# Patient Record
Sex: FEMALE | Race: BLACK OR AFRICAN AMERICAN
[De-identification: names, ages, dates, MRNs, and addresses within clinical notes are randomized per-mention and may not be internally consistent; named-entity substitution may affect disease eponyms.]

---

## 2018-04-09 ENCOUNTER — HOSPITAL ENCOUNTER (OUTPATIENT)
Dept: HOSPITAL 8 - CFH | Age: 53
Discharge: HOME | End: 2018-04-09
Attending: NURSE PRACTITIONER
Payer: COMMERCIAL

## 2018-04-09 DIAGNOSIS — R92.2: Primary | ICD-10-CM

## 2020-03-03 ENCOUNTER — OFFICE VISIT (OUTPATIENT)
Dept: URGENT CARE | Facility: PHYSICIAN GROUP | Age: 55
End: 2020-03-03
Payer: COMMERCIAL

## 2020-03-03 VITALS
DIASTOLIC BLOOD PRESSURE: 90 MMHG | SYSTOLIC BLOOD PRESSURE: 146 MMHG | OXYGEN SATURATION: 94 % | RESPIRATION RATE: 18 BRPM | HEART RATE: 102 BPM | HEIGHT: 58 IN | TEMPERATURE: 99 F

## 2020-03-03 DIAGNOSIS — R50.9 FEVER, UNSPECIFIED FEVER CAUSE: ICD-10-CM

## 2020-03-03 DIAGNOSIS — J11.1 INFLUENZA-LIKE ILLNESS: Primary | ICD-10-CM

## 2020-03-03 LAB
FLUAV+FLUBV AG SPEC QL IA: NORMAL
INT CON NEG: NEGATIVE
INT CON POS: POSITIVE

## 2020-03-03 PROCEDURE — 87804 INFLUENZA ASSAY W/OPTIC: CPT | Performed by: PHYSICIAN ASSISTANT

## 2020-03-03 PROCEDURE — 99203 OFFICE O/P NEW LOW 30 MIN: CPT | Performed by: PHYSICIAN ASSISTANT

## 2020-03-03 RX ORDER — EMPAGLIFLOZIN 25 MG/1
TABLET, FILM COATED ORAL
COMMUNITY

## 2020-03-03 RX ORDER — CODEINE PHOSPHATE/GUAIFENESIN 10-100MG/5
10 LIQUID (ML) ORAL 4 TIMES DAILY PRN
Qty: 200 ML | Refills: 0 | Status: SHIPPED | OUTPATIENT
Start: 2020-03-03 | End: 2020-03-08

## 2020-03-03 ASSESSMENT — ENCOUNTER SYMPTOMS: FEVER: 1

## 2020-03-03 NOTE — LETTER
March 3, 2020         Patient: Miroslava Harley   YOB: 1965   Date of Visit: 3/3/2020           To Whom it May Concern:    Miroslava Harley was seen in my clinic on 3/3/2020. She has been diagnosed with Influenza.     She may return to work on 3/9/2020.    If you have any questions or concerns, please don't hesitate to call.        Sincerely,           Rosamaria Fisher P.A.-C.  Electronically Signed

## 2020-03-03 NOTE — LETTER
March 3, 2020         Patient: Miroslava Harley   YOB: 1965   Date of Visit: 3/3/2020           To Whom it May Concern:    Miroslava Harley was seen in my clinic on 3/3/2020. She has been diagnosed with Influenza. She may return to work on 3/6/2020.    If you have any questions or concerns, please don't hesitate to call.        Sincerely,           Rosamaria Fisher P.A.-C.  Electronically Signed

## 2020-03-04 NOTE — PATIENT INSTRUCTIONS

## 2020-03-04 NOTE — PROGRESS NOTES
"Subjective:      Miroslava Harley is a 54 y.o. female who presents with Fever (fever x4 days, cough x3 days)    PMH:  has no past medical history on file.  MEDS:   Current Outpatient Medications:   •  Empagliflozin (JARDIANCE) 25 MG Tab, Take  by mouth., Disp: , Rfl:   •  SITagliptin (JANUVIA) 100 MG Tab, Take 100 mg by mouth every day., Disp: , Rfl:   ALLERGIES:   Allergies   Allergen Reactions   • Erythromycin      SURGHX: History reviewed. No pertinent surgical history.  SOCHX:    FH: Reviewed with patient, not pertinent to this visit.           Patient presents with:  Fever: fever x4 days, cough x3 days        Influenza   This is a new problem. Episode onset: 3 days. The problem occurs constantly. The problem has been rapidly worsening. Associated symptoms include chills, congestion, coughing, a fever, headaches, myalgias and a sore throat. Pertinent negatives include no chest pain, nausea or vomiting. The symptoms are aggravated by coughing and exertion. She has tried drinking, acetaminophen, lying down, NSAIDs, position changes and rest for the symptoms. The treatment provided mild relief.       Review of Systems   Constitutional: Positive for chills, fever and malaise/fatigue.   HENT: Positive for congestion, ear pain and sore throat.    Eyes: Negative for discharge.   Respiratory: Positive for cough and sputum production. Negative for shortness of breath.    Cardiovascular: Negative for chest pain.   Gastrointestinal: Negative for diarrhea, nausea and vomiting.   Musculoskeletal: Positive for myalgias.   Neurological: Positive for headaches.   All other systems reviewed and are negative.         Objective:     /90 (BP Location: Left arm, Patient Position: Sitting, BP Cuff Size: Small adult)   Pulse (!) 102   Temp 37.2 °C (99 °F) (Temporal)   Resp 18   Ht 1.473 m (4' 10\")   SpO2 94%      Physical Exam  Vitals signs and nursing note reviewed.   Constitutional:       General: She is not in acute " distress.     Appearance: Normal appearance. She is well-developed. She is ill-appearing. She is not toxic-appearing or diaphoretic.   HENT:      Head: Normocephalic and atraumatic.      Right Ear: Tympanic membrane and external ear normal.      Left Ear: Tympanic membrane and external ear normal.      Nose: Mucosal edema, congestion and rhinorrhea present.      Mouth/Throat:      Mouth: Mucous membranes are moist.      Pharynx: Uvula midline. Posterior oropharyngeal erythema present. No oropharyngeal exudate.      Tonsils: No tonsillar exudate.   Eyes:      General: Lids are normal.      Extraocular Movements: Extraocular movements intact.      Conjunctiva/sclera:      Right eye: Right conjunctiva is injected.      Left eye: Left conjunctiva is injected.      Pupils: Pupils are equal, round, and reactive to light.   Neck:      Musculoskeletal: Normal range of motion and neck supple.   Cardiovascular:      Rate and Rhythm: Normal rate and regular rhythm.      Pulses: Normal pulses.      Heart sounds: Normal heart sounds.   Pulmonary:      Effort: Pulmonary effort is normal. No respiratory distress.      Breath sounds: No wheezing or rales.   Abdominal:      Palpations: Abdomen is soft.   Musculoskeletal: Normal range of motion.   Lymphadenopathy:      Cervical: No cervical adenopathy.   Skin:     General: Skin is warm and dry.      Capillary Refill: Capillary refill takes less than 2 seconds.   Neurological:      General: No focal deficit present.      Mental Status: She is alert and oriented to person, place, and time.      Gait: Gait normal.   Psychiatric:         Mood and Affect: Mood normal.         Behavior: Behavior is cooperative.                 Assessment/Plan:     1. Influenza-like illness  guaifenesin-codeine (TUSSI-ORGANIDIN NR) 100-10 MG/5ML syrup    POCT Influenza A/B   2. Fever, unspecified fever cause  guaifenesin-codeine (TUSSI-ORGANIDIN NR) 100-10 MG/5ML syrup     PT likely has the flu, however, pt  presentation this clinic is out of the window for antiviral treatment or patient declined treatment.  This was discussed with patient/parent who verbalized understanding of discussion.    PT can continue OTC medications, increase fluids and rest until symptoms improve.     PT instructed not to drive or operate heavy machinery or drink alcohol while taking this medication because it contains either a narcotic or benzodiazepines which causes drowsiness. PT verbalized understanding of these instructions.     [facility] Aware web site evaluation: I have obtained and reviewed patient utilization report from [facility] database prior to writing prescription for controlled substance.  No history of abuse.    PT should follow up with PCP in 1-2 days for re-evaluation if symptoms have not improved.  Discussed red flags and reasons to return to UC or ED.  Pt and/or family verbalized understanding of diagnosis and follow up instructions and was offered informational handout on diagnosis.  PT discharged.

## 2020-03-06 ASSESSMENT — ENCOUNTER SYMPTOMS
DIARRHEA: 0
SHORTNESS OF BREATH: 0
CHILLS: 1
COUGH: 1
EYE DISCHARGE: 0
SPUTUM PRODUCTION: 1
HEADACHES: 1
NAUSEA: 0
SORE THROAT: 1
MYALGIAS: 1
VOMITING: 0

## 2023-03-22 ENCOUNTER — OFFICE VISIT (OUTPATIENT)
Dept: URGENT CARE | Facility: CLINIC | Age: 58
End: 2023-03-22
Payer: COMMERCIAL

## 2023-03-22 VITALS
HEART RATE: 103 BPM | TEMPERATURE: 98.1 F | WEIGHT: 170 LBS | DIASTOLIC BLOOD PRESSURE: 84 MMHG | HEIGHT: 58 IN | BODY MASS INDEX: 35.68 KG/M2 | RESPIRATION RATE: 16 BRPM | SYSTOLIC BLOOD PRESSURE: 138 MMHG | OXYGEN SATURATION: 97 %

## 2023-03-22 DIAGNOSIS — R04.0 EPISTAXIS: ICD-10-CM

## 2023-03-22 PROCEDURE — 99203 OFFICE O/P NEW LOW 30 MIN: CPT | Performed by: PHYSICIAN ASSISTANT

## 2023-03-22 NOTE — PROGRESS NOTES
"Subjective:   Miroslava Harley is a 57 y.o. female who presents for Nose Bleed (Bloody nose x past 30 mins)  This is a pleasant 57-year-old female who presents with acute onset of epistaxis at approximately 8:30 AM this morning.  She notes no antecedent trauma.  She is not using blood thinners.  She does not take excessive amounts of NSAIDs.  She has had a prior epistaxis episode several years ago but no others.  No foreign body concerns.  No significant seasonal allergies or sinusitis.  She does not feel lightheaded.  She has been compressing the nose but has not tried any other treatments.  No known history of thrombocytopenia.      Medications:  Jardiance Tabs  SITagliptin Tabs    Allergies:             Erythromycin    Surgical History:       No past surgical history on file.    Past Social Hx:  Miroslava Harley  reports that she has never smoked. She has never used smokeless tobacco.     Past Family Hx:   Miroslava Harley family history is not on file.       Problem list, medications, and allergies reviewed by myself today in Epic.     Objective:     /84 (BP Location: Left arm, Patient Position: Sitting, BP Cuff Size: Large adult)   Pulse (!) 103   Temp 36.7 °C (98.1 °F) (Temporal)   Resp 16   Ht 1.473 m (4' 10\")   Wt 77.1 kg (170 lb)   SpO2 97%   BMI 35.53 kg/m²     Physical Exam  Vitals and nursing note reviewed.   Constitutional:       General: She is not in acute distress.     Appearance: Normal appearance. She is not ill-appearing, toxic-appearing or diaphoretic.   HENT:      Head: Normocephalic.      Right Ear: External ear normal.      Left Ear: External ear normal.      Nose: No signs of injury or laceration.      Right Nostril: Epistaxis present.      Left Nostril: Epistaxis present.      Right Turbinates: Enlarged.      Left Turbinates: Enlarged.      Right Sinus: No maxillary sinus tenderness or frontal sinus tenderness.      Left Sinus: No maxillary sinus tenderness or frontal sinus " tenderness.      Comments: Epistaxis noted bilaterally upon arrival.  Patient encouraged to nose blow hard.  2 sprays of Jeff-Synephrine administered to both nares.  Nasal clamp placed for 20 minutes.  After clamping no residual epistaxis noted.  Patient was monitored in the office for another 20 to 30 minutes.  No residual bleeding noted.  Examination of septum demonstrates no obvious bleeders at this time.  No obvious oropharyngeal blood.  She does not taste blood.  Turbinates are enlarged and somewhat swollen.     Mouth/Throat:      Mouth: Mucous membranes are moist.   Eyes:      Extraocular Movements: Extraocular movements intact.      Conjunctiva/sclera: Conjunctivae normal.      Pupils: Pupils are equal, round, and reactive to light.   Cardiovascular:      Rate and Rhythm: Normal rate.      Pulses: Normal pulses.   Pulmonary:      Effort: Pulmonary effort is normal. No respiratory distress.   Musculoskeletal:      Cervical back: Normal range of motion.   Skin:     General: Skin is warm.      Findings: No lesion or rash.   Neurological:      General: No focal deficit present.      Mental Status: She is alert and oriented to person, place, and time.   Psychiatric:         Thought Content: Thought content normal.         Judgment: Judgment normal.         Assessment/Plan:     Diagnosis and Associated Orders:     1. Epistaxis        Comments/MDM:  Fortunately epistaxis was controlled with Jeff-Synephrine and clamping with no residual bleeding.  Patient monitored for an additional 30 minutes without signs of additional bleeding.  She is given the Jeff-Synephrine with instructions and a nasal clamp to take home.  Indications to go to the ED were discussed.  Conservative measures as below.  -May take longer acting antihistamine for seasonal allergy symptoms as needed   -May use saline nasal spray for nasal congestion or to prevent epistaxis due to nasal passage dryness or allergies  -May use Flonase or Nasocort for  allergen nasal congestion as needed (recommend use with saline nasal spray to prevent epistaxis)  -Monitor for continued epistaxis, headache, dizziness, weakness, hematemesis- need re-evaluation immediately, must go to ER, call 911  -Treat for nasal congestion as needed: avoid strong nose blowing, use saline nasal spray to loosen, moisten and cleanse dry mucus membranes before gently bowing nose   --Use HTN meds as directed- recommend BP reading when nose bleeds occur to eliminate cause   -Patient given nose clamp, instructed to clamp nose at bridge, cool compress to face while sitting back until nose bleed stops, avoid checking if stopped until at least 15 minutes of pressure to nose  -May use vaseline on both nostrils at least twice per day. Increase water intake, avoid touching nose too hard or picking at nose. Use a humidifier as much as possible. If worsening of symptoms, will consider referral to ENT.    I personally reviewed prior external notes and test results pertinent to today's visit.  Red flags discussed as well as indications to present to the Emergency Department.  Supportive care, natural history, differential diagnoses, and indications for immediate follow-up discussed.  Patient expresses understanding and agrees to plan.  Patient denies any other questions or concerns.    Follow-up with the primary care physician for recheck, reevaluation, and consideration of further management.      Please note that this dictation was created using voice recognition software. I have made a reasonable attempt to correct obvious errors, but I expect that there are errors of grammar and possibly content that I did not discover before finalizing the note.    This note was electronically signed by Dayan Barnett PA-C

## 2024-11-22 ENCOUNTER — HOSPITAL ENCOUNTER (OUTPATIENT)
Dept: RADIOLOGY | Facility: MEDICAL CENTER | Age: 59
End: 2024-11-22
Payer: COMMERCIAL

## 2024-11-22 ENCOUNTER — OFFICE VISIT (OUTPATIENT)
Dept: URGENT CARE | Facility: PHYSICIAN GROUP | Age: 59
End: 2024-11-22
Payer: COMMERCIAL

## 2024-11-22 VITALS
OXYGEN SATURATION: 100 % | WEIGHT: 158.73 LBS | BODY MASS INDEX: 33.32 KG/M2 | SYSTOLIC BLOOD PRESSURE: 124 MMHG | HEIGHT: 58 IN | TEMPERATURE: 97.3 F | RESPIRATION RATE: 16 BRPM | DIASTOLIC BLOOD PRESSURE: 82 MMHG | HEART RATE: 100 BPM

## 2024-11-22 DIAGNOSIS — M25.561 PAIN AND SWELLING OF KNEE, RIGHT: ICD-10-CM

## 2024-11-22 DIAGNOSIS — M79.661 PAIN OF RIGHT LOWER LEG: ICD-10-CM

## 2024-11-22 DIAGNOSIS — M25.461 PAIN AND SWELLING OF KNEE, RIGHT: ICD-10-CM

## 2024-11-22 DIAGNOSIS — M79.661 PAIN AND SWELLING OF RIGHT LOWER LEG: ICD-10-CM

## 2024-11-22 DIAGNOSIS — S80.01XA CONTUSION OF RIGHT KNEE, INITIAL ENCOUNTER: ICD-10-CM

## 2024-11-22 DIAGNOSIS — S39.012A BACK STRAIN, INITIAL ENCOUNTER: ICD-10-CM

## 2024-11-22 DIAGNOSIS — M79.89 PAIN AND SWELLING OF RIGHT LOWER LEG: ICD-10-CM

## 2024-11-22 PROCEDURE — 93971 EXTREMITY STUDY: CPT | Mod: RT

## 2024-11-22 PROCEDURE — 3074F SYST BP LT 130 MM HG: CPT

## 2024-11-22 PROCEDURE — 99213 OFFICE O/P EST LOW 20 MIN: CPT

## 2024-11-22 PROCEDURE — 3079F DIAST BP 80-89 MM HG: CPT

## 2024-11-22 PROCEDURE — 73564 X-RAY EXAM KNEE 4 OR MORE: CPT | Mod: RT

## 2024-11-22 RX ORDER — GABAPENTIN 100 MG/1
100 CAPSULE ORAL 2 TIMES DAILY
Qty: 30 CAPSULE | Refills: 0 | Status: SHIPPED | OUTPATIENT
Start: 2024-11-22

## 2024-11-22 NOTE — PROGRESS NOTES
Subjective:   Miroslava Harley is a 59 y.o. female who presents for Back Pain (Started 3-4 weeks ago after lifting 2 cases of water/Then fell on knee (right) on 11/5/2024 /P/ts pain and symptoms don't always present in a typical fashion /Tele health dr is concerned about a blood clot as the pains flair up at night when laying down p/t also feels warmth and heat coming from the leg  )      HPI:    Patient presents urgent care with concerns of back pain, right knee pain, swelling of right lower leg.  States symptoms started 3 to 4 weeks ago after she was lifting 2 cases of water she developed low back pain.  She states she hyperextended her back to lift a case of water.  She denies radiation of pain into her legs, numbness/tingling sensation pain has been mildly controlled with Salonpas patches, and Tylenol.  She states after she lifted a case of water she tripped on a case of soda and fell onto the right knee.  She has had intermittent swelling and pain of the right knee since the injury.  She also has burning sensation in the anterior lower leg.  She reports swelling of the right lower leg as well.  She was seen by a TeleDoc, goes to told her to come in to urgent care for evaluation for DVT.  Pain and swelling does improve when she elevates her right lower leg.  No improvement with Tylenol or Salonpas patches. States her right lower leg feels warm sometimes. Denies history of gout. Denies fever, chills, body aches, migratory joint pain.     ROS As above in HPI    Medications:    Current Outpatient Medications on File Prior to Visit   Medication Sig Dispense Refill    Empagliflozin (JARDIANCE) 25 MG Tab Take  by mouth. (Patient not taking: Reported on 11/22/2024)      SITagliptin (JANUVIA) 100 MG Tab Take 100 mg by mouth every day. (Patient not taking: Reported on 11/22/2024)       No current facility-administered medications on file prior to visit.        Allergies:   Erythromycin    Problem List:   There is no  "problem list on file for this patient.       Surgical History:  No past surgical history on file.    Past Social Hx:   Social History     Tobacco Use    Smoking status: Never    Smokeless tobacco: Never          Problem list, medications, and allergies reviewed by myself today in Epic.     Objective:     /82 (BP Location: Right arm, Patient Position: Sitting, BP Cuff Size: Adult)   Pulse 100   Temp 36.3 °C (97.3 °F) (Temporal)   Resp 16   Ht 1.473 m (4' 10\")   Wt 72 kg (158 lb 11.7 oz)   SpO2 100%   BMI 33.17 kg/m²     Physical Exam  Constitutional:       General: She is not in acute distress.     Appearance: Normal appearance. She is not ill-appearing.   HENT:      Head: Normocephalic.   Cardiovascular:      Rate and Rhythm: Normal rate and regular rhythm.      Heart sounds: Normal heart sounds.   Pulmonary:      Effort: Pulmonary effort is normal.      Breath sounds: Normal breath sounds.   Musculoskeletal:         General: Swelling, tenderness and signs of injury present.      Lumbar back: Tenderness present. No swelling, edema, deformity, signs of trauma, lacerations, spasms or bony tenderness. Normal range of motion. Negative right straight leg raise test and negative left straight leg raise test. No scoliosis.        Back:       Right knee: Swelling present. No deformity, effusion, erythema, ecchymosis, lacerations, bony tenderness or crepitus. Decreased range of motion. Tenderness present over the medial joint line. No LCL laxity, MCL laxity, ACL laxity or PCL laxity. Normal alignment, normal meniscus and normal patellar mobility. Normal pulse.      Instability Tests: Anterior drawer test negative. Posterior drawer test negative. Medial Deon test negative and lateral Deon test negative.      Right lower leg: Swelling present. No edema.      Left lower leg: No edema.      Right foot: Normal capillary refill. Normal pulse.        Legs:    Skin:     General: Skin is warm and dry.      " Capillary Refill: Capillary refill takes less than 2 seconds.      Findings: No rash.   Neurological:      Mental Status: She is alert and oriented to person, place, and time.         Assessment/Plan:     DX-KNEE COMPLETE 4+ RIGHT 11/22/2024    Narrative  11/22/2024 1:52 PM    HISTORY/REASON FOR EXAM:  Right knee pain after right knee injury ground-level fall      TECHNIQUE/EXAM DESCRIPTION AND NUMBER OF VIEWS:  4 views of the RIGHT knee.    COMPARISON: None    FINDINGS:  Bone density is normal.  There is no evidence of fracture or dislocation.  There is mild joint space narrowing and marginal spurring.  There is no joint effusion.    Impression  No evidence of fracture or dislocation.  Mild osteoarthritis.    US-EXTREMITY VENOUS LOWER UNILAT RIGHT 11/22/2024    Narrative  11/22/2024 6:00 PM    HISTORY/REASON FOR EXAM:  RIGHT lower extremity pain      TECHNIQUE/EXAM DESCRIPTION: RIGHT LOWER extremity doppler venous ultrasound was performed. Using both color and pulse-wave Doppler imaging, multiple images were obtained from the common femoral vein origins distally through the popliteal trifurcation(s).    COMPARISON:  None.    FINDINGS:    REAL-TIME GRAY-SCALE IMAGING:  Real-time gray-scale imaging reveals no evidence of focal wall thickening.    COLOR AND DUPLEX DOPPLER IMAGING:  Graded compression was used to demonstrate patent lumens.  There is no evidence for luminal thrombus.  There is normal response to augmentation and respiratory variation.    No abnormal fluid collections are seen.    Impression  No evidence of deep venous thrombosis.      Diagnosis and associated orders:   1. Back strain, initial encounter    2. Contusion of right knee, initial encounter  - DX-KNEE COMPLETE 4+ RIGHT; Future  - Knee Brace    3. Pain and swelling of right lower leg  - US-EXTREMITY VENOUS LOWER UNILAT RIGHT; Future    4. Pain of right lower leg  - gabapentin (NEURONTIN) 100 MG Cap; Take 1 Capsule by mouth 2 times a day.   Dispense: 30 Capsule; Refill: 0        Comments/MDM:     Per radiology impression:   US of RLE is negative for acute dvt  2.   Xray of Right knee is negative for acute fracture and dislocation. Mild osteoarthritis is present.     Suspect back strain from carrying cases of water followed by trip and fall injury onto her right knee while carrying in groceries. Continue NSAIDs/Tylenol per package instructions, activity as tolerated, lidocaine patches, elevation, ice packs, warm packs. Has burning neuropathic pain in anterior right lower leg, willing to trial gabapentin. Follow up with PCP advised. Return to  as needed.       Return to clinic or go to ED if symptoms worsen or persist. Indications for ED discussed at length. Patient/Parent/Guardian voices understanding. Follow-up with your primary care provider in 3-5 days. Red flag symptoms discussed. All side effects of medication discussed including allergic response, GI upset, tendon injury, rash, sedation etc.    Please note that this dictation was created using voice recognition software. I have made a reasonable attempt to correct obvious errors, but I expect that there are errors of grammar and possibly content that I did not discover before finalizing the note.    This note was electronically signed by ZAN Alcala

## 2025-05-09 ENCOUNTER — OFFICE VISIT (OUTPATIENT)
Dept: URGENT CARE | Facility: PHYSICIAN GROUP | Age: 60
End: 2025-05-09
Payer: COMMERCIAL

## 2025-05-09 ENCOUNTER — HOSPITAL ENCOUNTER (OUTPATIENT)
Facility: MEDICAL CENTER | Age: 60
End: 2025-05-09
Attending: PHYSICIAN ASSISTANT
Payer: COMMERCIAL

## 2025-05-09 VITALS
SYSTOLIC BLOOD PRESSURE: 164 MMHG | HEART RATE: 120 BPM | TEMPERATURE: 97.9 F | OXYGEN SATURATION: 97 % | DIASTOLIC BLOOD PRESSURE: 92 MMHG | BODY MASS INDEX: 35.68 KG/M2 | RESPIRATION RATE: 18 BRPM | WEIGHT: 170 LBS | HEIGHT: 58 IN

## 2025-05-09 DIAGNOSIS — L02.91 ABSCESS: ICD-10-CM

## 2025-05-09 PROCEDURE — 3077F SYST BP >= 140 MM HG: CPT | Performed by: PHYSICIAN ASSISTANT

## 2025-05-09 PROCEDURE — 90471 IMMUNIZATION ADMIN: CPT | Performed by: PHYSICIAN ASSISTANT

## 2025-05-09 PROCEDURE — 87186 SC STD MICRODIL/AGAR DIL: CPT

## 2025-05-09 PROCEDURE — 87077 CULTURE AEROBIC IDENTIFY: CPT | Mod: 91

## 2025-05-09 PROCEDURE — 87070 CULTURE OTHR SPECIMN AEROBIC: CPT

## 2025-05-09 PROCEDURE — 90715 TDAP VACCINE 7 YRS/> IM: CPT | Performed by: PHYSICIAN ASSISTANT

## 2025-05-09 PROCEDURE — 87205 SMEAR GRAM STAIN: CPT

## 2025-05-09 PROCEDURE — 3080F DIAST BP >= 90 MM HG: CPT | Performed by: PHYSICIAN ASSISTANT

## 2025-05-09 PROCEDURE — 99214 OFFICE O/P EST MOD 30 MIN: CPT | Mod: 25 | Performed by: PHYSICIAN ASSISTANT

## 2025-05-09 RX ORDER — SULFAMETHOXAZOLE AND TRIMETHOPRIM 800; 160 MG/1; MG/1
1 TABLET ORAL 2 TIMES DAILY
Qty: 14 TABLET | Refills: 0 | Status: SHIPPED | OUTPATIENT
Start: 2025-05-09 | End: 2025-05-16

## 2025-05-09 ASSESSMENT — ENCOUNTER SYMPTOMS
CHILLS: 0
BLURRED VISION: 0
FEVER: 0
DIZZINESS: 0
HEADACHES: 0
NECK PAIN: 1
DOUBLE VISION: 0

## 2025-05-09 NOTE — PROGRESS NOTES
Subjective:   Miroslava Harley is a 59 y.o. female who presents today with   Chief Complaint   Patient presents with    Spider Bite     Last week on her neck        Other  This is a new problem. The current episode started in the past 7 days. The problem occurs constantly. The problem has been gradually worsening. Associated symptoms include neck pain (local to the abscess). Pertinent negatives include no chills, fever or headaches. She has tried nothing for the symptoms. The treatment provided no relief.     Patient thinks it may have been possible spider bite but is unsure.  Has been bitten by a brown recluse and it does not hurt nearly as bad as that.  The area started draining on its own last night.    PMH:  has no past medical history on file.  MEDS:   Current Outpatient Medications:     sulfamethoxazole-trimethoprim (BACTRIM DS) 800-160 MG tablet, Take 1 Tablet by mouth 2 times a day for 7 days., Disp: 14 Tablet, Rfl: 0    gabapentin (NEURONTIN) 100 MG Cap, Take 1 Capsule by mouth 2 times a day. (Patient not taking: Reported on 5/9/2025), Disp: 30 Capsule, Rfl: 0    Empagliflozin (JARDIANCE) 25 MG Tab, Take  by mouth. (Patient not taking: Reported on 5/9/2025), Disp: , Rfl:     SITagliptin (JANUVIA) 100 MG Tab, Take 100 mg by mouth every day. (Patient not taking: Reported on 5/9/2025), Disp: , Rfl:   ALLERGIES:   Allergies   Allergen Reactions    Erythromycin      SURGHX: No past surgical history on file.  SOCHX:  reports that she has never smoked. She has never used smokeless tobacco. She reports current alcohol use. She reports that she does not use drugs.  FH: Reviewed with patient, not pertinent to this visit.       Review of Systems   Constitutional:  Negative for chills and fever.   Eyes:  Negative for blurred vision and double vision.   Musculoskeletal:  Positive for neck pain (local to the abscess).   Skin:         Abscess of neck   Neurological:  Negative for dizziness and headaches.         "Objective:   BP (!) 164/92   Pulse (!) 120   Temp 36.6 °C (97.9 °F)   Resp 18   Ht 1.473 m (4' 10\")   Wt 77.1 kg (170 lb)   SpO2 97%   BMI 35.53 kg/m²   Physical Exam  Vitals and nursing note reviewed.   Constitutional:       General: She is not in acute distress.     Appearance: Normal appearance. She is well-developed. She is not ill-appearing or toxic-appearing.   HENT:      Head: Normocephalic and atraumatic.      Right Ear: Hearing normal.      Left Ear: Hearing normal.   Cardiovascular:      Rate and Rhythm: Normal rate.   Pulmonary:      Effort: Pulmonary effort is normal.   Musculoskeletal:      Cervical back: Normal range of motion. No spinous process tenderness or muscular tenderness.      Comments: Normal movement in all 4 extremities   Skin:     General: Skin is warm and dry.      Comments: Central area of induration and erythema with no signs of necrosis or foreign body.  Approximately 1 cm centralized area with surrounding 2 cm of erythema in diameter.  Active purulent pus draining.   Neurological:      Mental Status: She is alert.      Coordination: Coordination normal.   Psychiatric:         Mood and Affect: Mood normal.           Assessment/Plan:   Assessment    1. Abscess  - CULTURE WOUND W/ GRAM STAIN; Future  - sulfamethoxazole-trimethoprim (BACTRIM DS) 800-160 MG tablet; Take 1 Tablet by mouth 2 times a day for 7 days.  Dispense: 14 Tablet; Refill: 0    No red flag signs on exam.  No signs of further infection or sepsis at this time.  Would recommend closely monitoring and would suggest following up in 48 hours for wound recheck or sooner with any new or worsening symptoms.  No indication for I&D as area is already draining on its own copiously.  Expressed most of the pus out of the area and area was cleaned and nonstick gauze and Tegaderm applied to the area.  Recommend regular bandage change and wound check.  Given possibility of it being a spider bite we will update tetanus as well " today.  Patient with some slightly elevated blood pressure likely related with the discomfort and infection.  No other symptoms noted at this time.    Differential diagnosis, natural history, supportive care, and indications for immediate follow-up discussed.   Patient given instructions and understanding of medications and treatment.    If not improving in 3-5 days, F/U with PCP or return to UC if symptoms worsen.  Strict ER precautions given.  Patient agreeable to plan.      Please note that this dictation was created using voice recognition software. I have made every reasonable attempt to correct obvious errors, but I expect that there are errors of grammar and possibly content that I did not discover before finalizing the note.    José Miguel Preciado PA-C

## 2025-05-10 LAB
GRAM STN SPEC: NORMAL
SIGNIFICANT IND 70042: NORMAL
SITE SITE: NORMAL
SOURCE SOURCE: NORMAL

## 2025-05-11 LAB
BACTERIA WND AEROBE CULT: ABNORMAL
BACTERIA WND AEROBE CULT: ABNORMAL
GRAM STN SPEC: ABNORMAL
SIGNIFICANT IND 70042: ABNORMAL
SITE SITE: ABNORMAL
SOURCE SOURCE: ABNORMAL

## 2025-05-12 ENCOUNTER — RESULTS FOLLOW-UP (OUTPATIENT)
Dept: URGENT CARE | Facility: CLINIC | Age: 60
End: 2025-05-12

## 2025-05-13 ENCOUNTER — OFFICE VISIT (OUTPATIENT)
Dept: URGENT CARE | Facility: PHYSICIAN GROUP | Age: 60
End: 2025-05-13
Payer: COMMERCIAL

## 2025-05-13 VITALS
HEIGHT: 58 IN | SYSTOLIC BLOOD PRESSURE: 128 MMHG | TEMPERATURE: 97.9 F | RESPIRATION RATE: 19 BRPM | DIASTOLIC BLOOD PRESSURE: 84 MMHG | OXYGEN SATURATION: 95 % | HEART RATE: 108 BPM | WEIGHT: 170 LBS | BODY MASS INDEX: 35.68 KG/M2

## 2025-05-13 DIAGNOSIS — L02.91 ABSCESS: ICD-10-CM

## 2025-05-13 PROCEDURE — 3079F DIAST BP 80-89 MM HG: CPT | Performed by: PHYSICIAN ASSISTANT

## 2025-05-13 PROCEDURE — 3074F SYST BP LT 130 MM HG: CPT | Performed by: PHYSICIAN ASSISTANT

## 2025-05-13 PROCEDURE — 99213 OFFICE O/P EST LOW 20 MIN: CPT | Performed by: PHYSICIAN ASSISTANT

## 2025-05-13 RX ORDER — SULFAMETHOXAZOLE AND TRIMETHOPRIM 800; 160 MG/1; MG/1
1 TABLET ORAL 2 TIMES DAILY
Qty: 6 TABLET | Refills: 0 | Status: SHIPPED | OUTPATIENT
Start: 2025-05-13

## 2025-05-13 NOTE — PROGRESS NOTES
Subjective     Miroslava Harley is a 59 y.o. female who presents with Spider Bite (Was told to come back to  on spider back on her neck )      HPI:  Miroslava Harley is a 59 y.o. female who presents today for reevaluation of a abscess/possible spider bite on the left side of her neck.  She was initially seen for this on 5/9/2025.  The wound was actively draining so I&D was not performed.  Swabs of the purulent discharge were sent to the lab which came back positive for Staphylococcus lugdunensis.  She was that it was pansensitive.  She was placed on trimeth/sulfa.  Says that she has 2 days remaining.  She has been changing the dressings multiple times per day but states that it has continued to drain copious purulent material.  He does feel a lot better than it did previously but it is still quite tender and sore.  No fever.        ROS      PMH:  has no past medical history on file.  MEDS:   Current Outpatient Medications:     sulfamethoxazole-trimethoprim (BACTRIM DS) 800-160 MG tablet, Take 1 Tablet by mouth 2 times a day., Disp: 6 Tablet, Rfl: 0    sulfamethoxazole-trimethoprim (BACTRIM DS) 800-160 MG tablet, Take 1 Tablet by mouth 2 times a day for 7 days., Disp: 14 Tablet, Rfl: 0    gabapentin (NEURONTIN) 100 MG Cap, Take 1 Capsule by mouth 2 times a day. (Patient not taking: Reported on 5/13/2025), Disp: 30 Capsule, Rfl: 0    Empagliflozin (JARDIANCE) 25 MG Tab, Take  by mouth. (Patient not taking: Reported on 11/22/2024), Disp: , Rfl:     SITagliptin (JANUVIA) 100 MG Tab, Take 100 mg by mouth every day. (Patient not taking: Reported on 11/22/2024), Disp: , Rfl:   ALLERGIES:   Allergies   Allergen Reactions    Erythromycin      SURGHX: No past surgical history on file.  SOCHX:  reports that she has never smoked. She has never used smokeless tobacco. She reports current alcohol use. She reports that she does not use drugs.  FH: Family history was reviewed, no pertinent findings to  "report        Objective     /84   Pulse (!) 108   Temp 36.6 °C (97.9 °F)   Resp 19   Ht 1.473 m (4' 10\")   Wt 77.1 kg (170 lb)   SpO2 95%   BMI 35.53 kg/m²      Physical Exam  Constitutional:       General: She is not in acute distress.     Appearance: She is not diaphoretic.   HENT:      Head: Normocephalic and atraumatic.      Right Ear: External ear normal.      Left Ear: External ear normal.   Eyes:      Conjunctiva/sclera: Conjunctivae normal.      Pupils: Pupils are equal, round, and reactive to light.   Pulmonary:      Effort: Pulmonary effort is normal. No respiratory distress.   Musculoskeletal:      Cervical back: Normal range of motion.   Skin:     Findings: Abscess present.             Comments: Abscess still noted on posterior neck, more to the left side.  There is a area of drainage with copious purulent material.  It is exquisitely tender to palpation.  There is some surrounding induration, especially with some induration and swelling inferior to the area of discharge.  There is no secondary area of fluctuance or discharge noted.   Neurological:      Mental Status: She is alert and oriented to person, place, and time.   Psychiatric:         Mood and Affect: Mood and affect normal.         Cognition and Memory: Memory normal.         Judgment: Judgment normal.           Assessment & Plan     1. Abscess  - Referral to General Surgery  - sulfamethoxazole-trimethoprim (BACTRIM DS) 800-160 MG tablet; Take 1 Tablet by mouth 2 times a day.  Dispense: 6 Tablet; Refill: 0  Attempted to gently express discharge from the wound.  Was able to get a moderate amount but patient was not tolerating this well.  She would prefer to try on her own at home to get additional discharge out.  Wound was cleaned and new dressing of nonstick gauze and Tegaderm was applied.  She she continued to change her dressings multiple times per day.  Also discussed that she should try application of moist warm compresses " multiple times per day.  Recommend that she return again in 3 to 4 days for wound check.  Given the size of the abscess, and its location, I have also placed referral to general surgery to further evaluate.  I feel it may need more formal treatment.            Differential Diagnosis, natural history, and supportive care discussed. Return to the Urgent Care or follow up with your PCP if symptoms fail to resolve, or for any new or worsening symptoms. Emergency room precautions discussed. Patient and/or family appears understanding of information.

## 2025-05-14 NOTE — Clinical Note
REFERRAL APPROVAL NOTICE         Sent on May 14, 2025                   Miroslava Harley  [address] [address]                   Dear Ms. Harley,    After a careful review of the medical information and benefit coverage, [facility] has processed your referral. See below for additional details.    If applicable, you must be actively enrolled with your insurance for coverage of the authorized service. If you have any questions regarding your coverage, please contact your insurance directly.    REFERRAL INFORMATION   Referral #:  [id number]  Referred-To Department    Referred-By Provider:  General Surgery    Thelma Barrett P.A.-C.   Department Of Surgery      [address]  [address]  [place] [address] [phone] [address], [address]  [place] [id number] [phone]    Referral Start Date:  05/13/2025  Referral End Date:   05/13/2026             SCHEDULING  If you do not already have an appointment, please call [phone] to make an appointment.     MORE INFORMATION  If you do not already have a [employer] account, sign up at: [email or web address]  You can access your medical information, make appointments, see lab results, billing information, and more.  If you have questions regarding this referral, please contact  the [facility] Referrals department at:             [phone]. Monday - Friday 8:00AM - 5:00PM.     Sincerely,    [facility]